# Patient Record
Sex: FEMALE | Race: BLACK OR AFRICAN AMERICAN | NOT HISPANIC OR LATINO | Employment: UNEMPLOYED | URBAN - METROPOLITAN AREA
[De-identification: names, ages, dates, MRNs, and addresses within clinical notes are randomized per-mention and may not be internally consistent; named-entity substitution may affect disease eponyms.]

---
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
## 2023-06-13 PROBLEM — M94.0 COSTOCHONDRITIS, ACUTE: Status: ACTIVE | Noted: 2023-06-13

## 2023-06-13 PROBLEM — R55 SYNCOPE: Status: ACTIVE | Noted: 2023-06-13

## 2023-06-13 PROBLEM — Z86.2 HX OF IRON DEFICIENCY ANEMIA: Status: ACTIVE | Noted: 2023-06-13

## 2023-09-05 PROBLEM — M94.0 COSTOCHONDRITIS, ACUTE: Chronic | Status: ACTIVE | Noted: 2023-06-13
